# Patient Record
Sex: MALE | Race: WHITE | Employment: OTHER | URBAN - METROPOLITAN AREA
[De-identification: names, ages, dates, MRNs, and addresses within clinical notes are randomized per-mention and may not be internally consistent; named-entity substitution may affect disease eponyms.]

---

## 2018-08-03 ENCOUNTER — HOSPITAL ENCOUNTER (OUTPATIENT)
Dept: MRI IMAGING | Age: 33
Discharge: HOME | End: 2018-08-03
Payer: COMMERCIAL

## 2018-08-03 DIAGNOSIS — M25.521 ELBOW PAIN, RIGHT: ICD-10-CM

## 2018-08-03 PROCEDURE — 73221 MRI JOINT UPR EXTREM W/O DYE: CPT

## 2019-08-30 ENCOUNTER — OFFICE VISIT (OUTPATIENT)
Dept: FAMILY MEDICINE CLINIC | Age: 34
End: 2019-08-30
Payer: COMMERCIAL

## 2019-08-30 VITALS
WEIGHT: 183.5 LBS | HEART RATE: 76 BPM | SYSTOLIC BLOOD PRESSURE: 121 MMHG | HEIGHT: 69 IN | RESPIRATION RATE: 18 BRPM | OXYGEN SATURATION: 98 % | BODY MASS INDEX: 27.18 KG/M2 | DIASTOLIC BLOOD PRESSURE: 79 MMHG | TEMPERATURE: 98.3 F

## 2019-08-30 DIAGNOSIS — F98.8 ATTENTION DEFICIT DISORDER (ADD) WITHOUT HYPERACTIVITY: ICD-10-CM

## 2019-08-30 DIAGNOSIS — Z00.00 ROUTINE GENERAL MEDICAL EXAMINATION AT A HEALTH CARE FACILITY: ICD-10-CM

## 2019-08-30 DIAGNOSIS — Z23 NEEDS FLU SHOT: ICD-10-CM

## 2019-08-30 DIAGNOSIS — Z23 NEED FOR INFLUENZA VACCINATION: Primary | ICD-10-CM

## 2019-08-30 PROCEDURE — 99395 PREV VISIT EST AGE 18-39: CPT | Performed by: FAMILY MEDICINE

## 2019-08-30 PROCEDURE — 90471 IMMUNIZATION ADMIN: CPT | Performed by: FAMILY MEDICINE

## 2019-08-30 PROCEDURE — 90688 IIV4 VACCINE SPLT 0.5 ML IM: CPT | Performed by: FAMILY MEDICINE

## 2019-08-30 RX ORDER — DEXTROAMPHETAMINE SACCHARATE, AMPHETAMINE ASPARTATE MONOHYDRATE, DEXTROAMPHETAMINE SULFATE AND AMPHETAMINE SULFATE 7.5; 7.5; 7.5; 7.5 MG/1; MG/1; MG/1; MG/1
30 CAPSULE, EXTENDED RELEASE ORAL EVERY MORNING
Qty: 30 CAPSULE | Refills: 0 | Status: SHIPPED | OUTPATIENT
Start: 2019-08-30 | End: 2019-09-27 | Stop reason: SDUPTHER

## 2019-08-30 ASSESSMENT — PATIENT HEALTH QUESTIONNAIRE - PHQ9
SUM OF ALL RESPONSES TO PHQ QUESTIONS 1-9: 0
SUM OF ALL RESPONSES TO PHQ QUESTIONS 1-9: 0
1. LITTLE INTEREST OR PLEASURE IN DOING THINGS: 0
2. FEELING DOWN, DEPRESSED OR HOPELESS: 0
SUM OF ALL RESPONSES TO PHQ9 QUESTIONS 1 & 2: 0

## 2019-08-30 NOTE — PATIENT INSTRUCTIONS
1) Call or send [employer] message in 1 to 2 weeks if XR Adderall is or is not effective. 2) Review info about the Tdap (tetanus, whooping cough vaccine) which could be done in a yearly check-up. 3) Today you received your flu shot. Recommendations for Adults   Get at least 150 minutes per week of moderate-intensity aerobic activity or 75 minutes per week of vigorous aerobic activity, or a combination of both, preferably spread throughout the week.  Add moderate- to high-intensity muscle-strengthening activity (such as resistance or weights) on at least 2 days per week.  Spend less time sitting. Even light-intensity activity can offset some of the risks of being sedentary.  Gain even more benefits by being active at least 300 minutes (5 hours) per week.  Increase amount and intensity gradually over time. Recommendations for Kids   Children 15 years old should be physically active and have plenty of opportunities to move throughout the day.  Kids 6-12 years old should get at least 60 minutes per day of moderate- to vigorous-intensity physical activity, mostly aerobic.  Include vigorous-intensity activity on at least 3 days per week.  Include muscle- and bone-strengthening (weight-bearing) activities on at least 3 days per week.  Increase amount and intensity gradually over time. What is intensity? Physical activity is anything that moves your body and burns calories. This includes things like walking, climbing stairs and stretching. Aerobic (or cardio) activity gets your heart rate up and benefits your heart by improving cardiorespiratory fitness. When done at moderate intensity, your heart will beat faster and youll breathe harder than normal, but youll still be able to talk. Think of it as a medium or moderate amount of effort.   Examples of moderate-intensity aerobic activities:   brisk walking (at least 2.5 miles per hour)    water aerobics    dancing (ballroom or

## 2019-09-27 DIAGNOSIS — F98.8 ATTENTION DEFICIT DISORDER (ADD) WITHOUT HYPERACTIVITY: ICD-10-CM

## 2019-09-27 RX ORDER — DEXTROAMPHETAMINE SACCHARATE, AMPHETAMINE ASPARTATE MONOHYDRATE, DEXTROAMPHETAMINE SULFATE AND AMPHETAMINE SULFATE 7.5; 7.5; 7.5; 7.5 MG/1; MG/1; MG/1; MG/1
30 CAPSULE, EXTENDED RELEASE ORAL DAILY
Qty: 30 CAPSULE | Refills: 0 | Status: SHIPPED | OUTPATIENT
Start: 2019-09-29 | End: 2021-08-23

## 2019-09-27 RX ORDER — DEXTROAMPHETAMINE SACCHARATE, AMPHETAMINE ASPARTATE MONOHYDRATE, DEXTROAMPHETAMINE SULFATE AND AMPHETAMINE SULFATE 7.5; 7.5; 7.5; 7.5 MG/1; MG/1; MG/1; MG/1
30 CAPSULE, EXTENDED RELEASE ORAL EVERY MORNING
Qty: 30 CAPSULE | Refills: 0 | Status: SHIPPED | OUTPATIENT
Start: 2019-10-29 | End: 2021-08-23 | Stop reason: ALTCHOICE

## 2019-09-27 NOTE — TELEPHONE ENCOUNTER
Tonny Boykin called to check on the status of his refill. He was seen on 8/30/2019 and told to return in three months. His follow up appointment is scheduled for 11/4/2019. He was advised that he would get his refill for Adderall 30 mg for August, September, and October, but he only got the August script. He would like to know if his September and October script can be sent to his pharmacy. Pt has been sending messages on his refills since 9/24/2019. This message is being routed high priority. Please advise. Thanks.         Please follow up with pt [phone] (home)        [facility] #[id number], [address] [facility]

## 2020-06-25 ENCOUNTER — NURSE TRIAGE (OUTPATIENT)
Dept: OTHER | Facility: CLINIC | Age: 35
End: 2020-06-25

## 2020-06-25 ENCOUNTER — TELEPHONE (OUTPATIENT)
Dept: ADMINISTRATIVE | Age: 35
End: 2020-06-25

## 2020-06-26 ENCOUNTER — OFFICE VISIT (OUTPATIENT)
Dept: FAMILY MEDICINE CLINIC | Age: 35
End: 2020-06-26
Payer: COMMERCIAL

## 2020-06-26 VITALS
DIASTOLIC BLOOD PRESSURE: 78 MMHG | BODY MASS INDEX: 27.8 KG/M2 | HEART RATE: 73 BPM | TEMPERATURE: 96.5 F | OXYGEN SATURATION: 98 % | SYSTOLIC BLOOD PRESSURE: 115 MMHG | HEIGHT: 68 IN | WEIGHT: 183.4 LBS

## 2020-06-26 PROCEDURE — 17110 DESTRUCTION B9 LES UP TO 14: CPT | Performed by: FAMILY MEDICINE

## 2020-06-26 PROCEDURE — 99213 OFFICE O/P EST LOW 20 MIN: CPT | Performed by: FAMILY MEDICINE

## 2020-06-26 RX ORDER — HALOBETASOL PROPIONATE 0.05 %
OINTMENT (GRAM) TOPICAL
Qty: 50 G | Refills: 0 | Status: SHIPPED | OUTPATIENT
Start: 2020-06-26 | End: 2021-08-23 | Stop reason: ALTCHOICE

## 2020-06-26 ASSESSMENT — PATIENT HEALTH QUESTIONNAIRE - PHQ9
SUM OF ALL RESPONSES TO PHQ9 QUESTIONS 1 & 2: 0
SUM OF ALL RESPONSES TO PHQ QUESTIONS 1-9: 0
SUM OF ALL RESPONSES TO PHQ QUESTIONS 1-9: 0
2. FEELING DOWN, DEPRESSED OR HOPELESS: 0
1. LITTLE INTEREST OR PLEASURE IN DOING THINGS: 0

## 2021-04-09 ENCOUNTER — IMMUNIZATION (OUTPATIENT)
Dept: PRIMARY CARE CLINIC | Age: 36
End: 2021-04-09
Payer: COMMERCIAL

## 2021-04-09 PROCEDURE — 0011A COVID-19, MODERNA VACCINE 100MCG/0.5ML DOSE: CPT | Performed by: FAMILY MEDICINE

## 2021-04-09 PROCEDURE — 91301 COVID-19, MODERNA VACCINE 100MCG/0.5ML DOSE: CPT | Performed by: FAMILY MEDICINE

## 2021-05-14 ENCOUNTER — IMMUNIZATION (OUTPATIENT)
Dept: PRIMARY CARE CLINIC | Age: 36
End: 2021-05-14
Payer: COMMERCIAL

## 2021-05-14 PROCEDURE — 91301 COVID-19, MODERNA VACCINE 100MCG/0.5ML DOSE: CPT | Performed by: FAMILY MEDICINE

## 2021-05-14 PROCEDURE — 0012A PR IMM ADMN SARSCOV2 100 MCG/0.5 ML 2ND DOSE: CPT | Performed by: FAMILY MEDICINE

## 2021-08-23 ENCOUNTER — OFFICE VISIT (OUTPATIENT)
Dept: FAMILY MEDICINE CLINIC | Age: 36
End: 2021-08-23
Payer: COMMERCIAL

## 2021-08-23 VITALS
BODY MASS INDEX: 28.64 KG/M2 | WEIGHT: 185.6 LBS | SYSTOLIC BLOOD PRESSURE: 116 MMHG | OXYGEN SATURATION: 98 % | RESPIRATION RATE: 16 BRPM | HEART RATE: 89 BPM | TEMPERATURE: 96.8 F | DIASTOLIC BLOOD PRESSURE: 82 MMHG

## 2021-08-23 DIAGNOSIS — Z82.49 FHX: HEART DISEASE: ICD-10-CM

## 2021-08-23 DIAGNOSIS — Z00.00 ROUTINE GENERAL MEDICAL EXAMINATION AT A HEALTH CARE FACILITY: ICD-10-CM

## 2021-08-23 DIAGNOSIS — Z91.018 FOOD ALLERGY: Primary | ICD-10-CM

## 2021-08-23 PROCEDURE — 99213 OFFICE O/P EST LOW 20 MIN: CPT | Performed by: FAMILY MEDICINE

## 2021-08-23 RX ORDER — DEXTROAMPHETAMINE SACCHARATE, AMPHETAMINE ASPARTATE, DEXTROAMPHETAMINE SULFATE AND AMPHETAMINE SULFATE 7.5; 7.5; 7.5; 7.5 MG/1; MG/1; MG/1; MG/1
TABLET ORAL
COMMUNITY
Start: 2021-08-20

## 2021-08-23 SDOH — ECONOMIC STABILITY: TRANSPORTATION INSECURITY
IN THE PAST 12 MONTHS, HAS LACK OF TRANSPORTATION KEPT YOU FROM MEETINGS, WORK, OR FROM GETTING THINGS NEEDED FOR DAILY LIVING?: NO

## 2021-08-23 SDOH — ECONOMIC STABILITY: TRANSPORTATION INSECURITY
IN THE PAST 12 MONTHS, HAS THE LACK OF TRANSPORTATION KEPT YOU FROM MEDICAL APPOINTMENTS OR FROM GETTING MEDICATIONS?: NO

## 2021-08-23 SDOH — ECONOMIC STABILITY: FOOD INSECURITY: WITHIN THE PAST 12 MONTHS, THE FOOD YOU BOUGHT JUST DIDN'T LAST AND YOU DIDN'T HAVE MONEY TO GET MORE.: NEVER TRUE

## 2021-08-23 SDOH — ECONOMIC STABILITY: FOOD INSECURITY: WITHIN THE PAST 12 MONTHS, YOU WORRIED THAT YOUR FOOD WOULD RUN OUT BEFORE YOU GOT MONEY TO BUY MORE.: NEVER TRUE

## 2021-08-23 ASSESSMENT — SOCIAL DETERMINANTS OF HEALTH (SDOH): HOW HARD IS IT FOR YOU TO PAY FOR THE VERY BASICS LIKE FOOD, HOUSING, MEDICAL CARE, AND HEATING?: NOT HARD AT ALL

## 2021-08-23 NOTE — PATIENT INSTRUCTIONS
CONTINUE TO FIND A WAY TO GET THE EXERCISE TO ACHIEVE YOUR WEIGHT GOALS. IF NEEDING TO CONSULT WITH NUTRITIONIST, REACH OUT TO [employer]. PLAN TO COMPLETE FASTING LABWORK IDEALLY IN 2021. CALL [employer] TO SCHEDULE CALCIUM SCORING TEST. NO APPOINTMENT NECESSARY  WE ACCEPT ALL MAJOR INSURANCE PLANS  WE ACCEPT SELF PAYING PATIENTS  CALL [phone] FOR MORE INFORMATION    [facility]  [address], [address] [address], [address]  Phone: [phone] Fax: [phone]  Mon.Fri. 7 a. m.5 p.m. Sat. 8 a.m.Noon    [address] [facility] lazaro [address]  Phone: [phone] and [phone]  Fax: [phone]  Mon.Fri. 7:30 a. m.4 p.m. Glynn Kiran [address] [place] [address], [facility]  Phone: [phone]  Mon.Fri. 7:30 a. m.4 p.m. [facility] Services [address] [place] [address]. [address]  [place], [address] [place] [address] [address]  Phone [phone]  Fax: [phone]  Mon-Fri 8 a.m.-5:30 p.m. [address] [facility]  [address] [facility], [address] [facility]  Phone: [phone]  Fax [phone]  Mon-Fri 7:30 a.m - 4 p.m. [facility]  [address] [facility], [address] [place] [address], [address]  Phone: [phone]  [facility]., Tue., Thu., Fri. 7:30 a. m.5 p.m.  Wed. 7:30 a.m.[facility]  [address]  [place], [address] [facility]  Phone: [phone] Fax: [phone]  Mon.Fri. 7:30 a. m.4 p.m. [address] [facility] and Lab Services  [address], [address] [facility], [facility]  Phone: [phone] Fax: [phone]  Mon.Fri. 8 a. m.4:30 p.m. [address]  Phone: [phone]  Mon.Fri. 7:30 a. m.4 p.m.     Stephanie Barrett - Lab Services  [address]  [facility]  Phone: [phone] Fax: [phone]  Mon.Fri. 7 a.m.5 p.m. Sat. 8 a.m.Noon [facility] [place], [place] [address]  [address]  Phone: [phone] Fax: [phone]  Mon.Fri. 7 a. m.5 p.m. [facility]  [address] [place], [address] [facility]  Phone: [phone] Fax: [phone]  Mon.Fri. 7 a. m.5 p.m. [address] [facility]  [address] [facility], [address] [place] [address]  Phone: [phone]  Mon.Fri. 6:30 a. m.6 p.m. Sat. 7 a. m. 1 p.m. [place] [address], [place], [facility]  Phone: [phone]  Open 24/7    [address], [address] [place] [address]  Phone: [phone]  Mon.Fri. 6 a. m.7 p.m. Sat. 7 a. m.3 p.m. [facility]  [address] [facility]  Phone: [phone]  Mon.Fri. 7 a. m.5 p.m. Sat. Sun. 8a.12 p.m    [facility] and [address]  Phone: [phone]  Mon.Fri. 9 a. m.  1 p.m. [address] [place] [address]. [address]  Phone: [phone]  Open 24/7    [facility]  [address], [address] [place] [address]  Phone: [phone]  Mon.Fri. 6:30 a. m.6:30 p.m. Sat. 8 a.m.[facility]  Phone: [phone] Fax: [phone]  Mon.Fri. 8:30 a. m.5 p.m. [facility]   [address]  Phone: [phone] Fax: [phone]  Mon.Fri. 8 a. m.4:30 p.m. [address]  Phone: [phone]  Fax: [phone]  Mon-Fri 7:30 am 4 p.m. Please call ahead to check hours.

## 2021-08-23 NOTE — PROGRESS NOTES
[facility]  Progress Note  Dona Kayser, DO          Glenn Grove  1985 08/23/21    Chief Complaint:   Glenn Grove is a 31 y.o. 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 Would like to get his weight under 180. His father-in-law's had recent heart problems identified. Patient is interested to get some baseline blood work and other testing. Experiences abdominal bloating and distention at times with certain foods. He did a at home test to check for certain food allergies. He does not have those reports with him. Not known to have any peanut allergies. Does see a psychiatrist in the [place] area. ROS negative for headache, visionchanges, chest pain, shortness of breath, abdominal pain, urinary sx, bowel changes. Past medical, surgical, and social history reviewed. and allergies reviewed. Allergies   Allergen Reactions    Benadryl [Diphenhydramine]      Prior to Visit Medications    Medication Sig Taking? Authorizing Provider   amphetamine-dextroamphetamine (ADDERALL) 30 MG tablet TAKE 1 AND 1/2 TABLETS BY MOUTH TWICE DAILY Yes Historical Provider, MD          Vitals:    08/23/21 1420 08/23/21 1448   BP: 130/88 116/82   Pulse: 89    Resp: 16    Temp: 96.8 °F (36 °C)    TempSrc: Tympanic    SpO2: 98%    Weight: 185 lb 9.6 oz (84.2 kg)       Wt Readings from Last 3 Encounters:   08/23/21 185 lb 9.6 oz (84.2 kg)   06/26/20 183 lb 6.4 oz (83.2 kg)   08/30/19 183 lb 8 oz (83.2 kg)     BP Readings from Last 3 Encounters:   08/23/21 116/82   06/26/20 115/78   08/30/19 121/79       There is no problem list on file for this patient.       Immunization History   Administered Date(s) Administered    COVID-19, Moderna, PF, 100mcg/0.5mL 04/09/2021, 05/14/2021    Influenza, Quadv, IM, (6 mo and older Fluzone, Flulaval, Fluarix and 3 yrs and older Afluria) 08/30/2019       Past Medical History:   Diagnosis Date    ADHD      Past Surgical History:   Procedure Laterality Date    ADENOIDECTOMY      childhood    TYMPANOSTOMY TUBE PLACEMENT      child     Family History   Problem Relation Age of Onset    Heart Attack Maternal Grandfather     Cancer Paternal Grandfather         throat     Social History     Socioeconomic History    Marital status:      Spouse name: Not on file    Number of children: Not on file    Years of education: Not on file    Highest education level: Not on file   Occupational History    Not on file   Tobacco Use    Smoking status: Never Smoker    Smokeless tobacco: Never Used   Substance and Sexual Activity    Alcohol use: Yes     Comment: soc     Drug use: No    Sexual activity: Not on file   Other Topics Concern    Not on file   Social History Narrative    Not on file     Social Determinants of Health     Financial Resource Strain: Low Risk     Difficulty of Paying Living Expenses: Not hard at all   Food Insecurity: No Food Insecurity    Worried About [address] [employer] in the Last Year: Never true    [address] in the Last Year: Never true   Transportation Needs: No Transportation Needs    Lack of Transportation (Medical): No    Lack of Transportation (Non-Medical):  No   Physical Activity:     Days of Exercise per Week:     Minutes of Exercise per Session:    Stress:     Feeling of Stress :    Social Connections:     Frequency of Communication with Friends and Family:     Frequency of Social Gatherings with Friends and Family:     Attends Sikhism Services:     Active Member of Clubs or Organizations:     Attends Club or Organization Meetings:     Marital Status:    Intimate Partner Violence:     Fear of Current or Ex-Partner:     Emotionally Abused:     Physically Abused:     Sexually Abused:        O: /82   Pulse 89   Temp 96.8 °F (36 °C) (Tympanic)   Resp 16   Wt 185 lb 9.6 oz (84.2 kg) SpO2 98%   BMI 28.64 kg/m²   Physical Exam  GEN: No acute distress,cooperative, well nourished, alert. HEENT: PEERLA, EOMI , normocephalic/atraumatic, external nose appears normal.  External ear is normal.    Neck: soft, supple, no appreciable thyromegaly,mass  CV: No upper extremity edema. Resp:  Breathing comfortably. Psych:normal affect. Neuro: AOx3  Other Pertinent Physical Exam findings:   Heart: Normal S1 and S2 with regular rhythm. Lungs: Clear to auscultation bilaterally. Abd: No tenderness to palpation. ASSESSMENT   Diagnosis Orders   1. Food allergy  Allergen, Food, Comprehensive Profile 1   2. FHx: heart disease  CT CARDIAC CALCIUM SCORING   3. Routine general medical examination at a [facility] facility  LIPID PANEL    COMPREHENSIVE METABOLIC PANEL       Check for specific food allergies with the blood work. He will call to schedule the CT calcium scoring test. Discussed importance of getting baseline lab work. PLAN          Time spent on encounter (including any number of the following: review of labs, imaging, provider notes, outside hospital records; performing examination/evaluation; counseling patient and family; ordering medications/tests; placing referrals and communication with referring physicians; coordination of care, and documentation in the EHR): 26 minutes  Established E/M: 10-19 (59152), 20-29 (85113), 30-39 (22518), 40-54 (87566)   New E/M: 15-29 (72519), 30-44 (99863), 45-59 (02669), 60-74 (82111)  Telephone E/M: 5-10 (Beto), 11-20 (21194), 21-30 (15766)    If applicable, see additional patient information and instructions under \"Patient Instructions. \"    Return if symptoms worsen or fail to improve. Patient Instructions   CONTINUE TO FIND A WAY TO GET THE EXERCISE TO ACHIEVE YOUR WEIGHT GOALS. IF NEEDING TO CONSULT WITH NUTRITIONIST, REACH OUT TO [employer]. PLAN TO COMPLETE FASTING LABWORK IDEALLY IN 2021.     CALL [employer] TO SCHEDULE CALCIUM SCORING TEST.      NO APPOINTMENT NECESSARY  WE ACCEPT ALL MAJOR INSURANCE PLANS  WE ACCEPT SELF PAYING PATIENTS  CALL [phone] FOR MORE INFORMATION    [facility]  [address] [employer], [address] [facility], [address] [place] [address]  Phone: [phone] Fax: [phone]  Mon.Fri. 7 a. m.5 p.m. Sat. 8 a.m.Noon    [address] [facility]  [facility] [place]  Phone: [phone] and [phone]  Fax: [phone]  Mon.Fri. 7:30 a. m.4 p.m. Glynn Kiran [address]  [facility] 70  Phone: [phone]  Mon.Fri. 7:30 a. m.4 p.m. [facility] [address] [place] [address] [address]  [place], [address] [place] [address] [address]  Phone [phone]  Fax: [phone]  Mon-Fri 8 a.m.-5:30 p.m. [facility] [facility]  [address] [facility], [address] [facility]  Phone: [phone]  Fax [phone]  Mon-Fri 7:30 a.m - 4 p.m. [facility]  [address], [address], [address]  Phone: [phone]  Kofi Kellogg., Tue., Thu., Fri. 7:30 a. m.5 p.m.  Wed. 7:30 a.m.[facility]  [address] [facility] [address] [address]  [place], [address] [facility]  Phone: [phone] Fax: [phone]  Mon.Fri. 7:30 a. m.4 p.m. [address] [facility] and Lab Services  [facility] [address], [address] [facility], [address] [facility]  Phone: [phone] Fax: [phone]  Mon.Fri. 8 a. m.4:30 p.m. [address] [facility] [place], [facility] [facility] [address]  Phone: [phone]  Mon.Fri. 7:30 a. m.4 p.m. [address] [facility]  [address] [facility]  Phone: [phone] Fax: [phone]  Mon.Fri. 7 a. m.5 p.m. Sat. 8 a.m.Noon [facility] [place], [facility] [address]  [place], [address] [place] [address]  Phone: [phone] Fax: [phone]  Mon.Fri. 7 a. m.5 p.m.     [employer]  [facility] Bob Ashbyalyssa Manning [place], [place]

## 2024-01-30 ENCOUNTER — OFFICE VISIT (OUTPATIENT)
Dept: FAMILY MEDICINE CLINIC | Age: 39
End: 2024-01-30
Payer: COMMERCIAL

## 2024-01-30 VITALS
WEIGHT: 189 LBS | DIASTOLIC BLOOD PRESSURE: 84 MMHG | HEART RATE: 76 BPM | TEMPERATURE: 98.5 F | BODY MASS INDEX: 28.64 KG/M2 | HEIGHT: 68 IN | SYSTOLIC BLOOD PRESSURE: 118 MMHG | OXYGEN SATURATION: 97 %

## 2024-01-30 DIAGNOSIS — F90.2 ATTENTION DEFICIT HYPERACTIVITY DISORDER (ADHD), COMBINED TYPE: ICD-10-CM

## 2024-01-30 DIAGNOSIS — J01.40 ACUTE NON-RECURRENT PANSINUSITIS: ICD-10-CM

## 2024-01-30 DIAGNOSIS — Z00.00 ROUTINE GENERAL MEDICAL EXAMINATION AT A HEALTH CARE FACILITY: Primary | ICD-10-CM

## 2024-01-30 PROBLEM — J01.90 ACUTE SINUSITIS: Status: ACTIVE | Noted: 2024-01-30

## 2024-01-30 PROBLEM — F98.8 ATTENTION DEFICIT DISORDER: Status: ACTIVE | Noted: 2024-01-30

## 2024-01-30 PROCEDURE — 99395 PREV VISIT EST AGE 18-39: CPT | Performed by: NURSE PRACTITIONER

## 2024-01-30 RX ORDER — DEXTROAMPHETAMINE SACCHARATE, AMPHETAMINE ASPARTATE MONOHYDRATE, DEXTROAMPHETAMINE SULFATE AND AMPHETAMINE SULFATE 7.5; 7.5; 7.5; 7.5 MG/1; MG/1; MG/1; MG/1
1 CAPSULE, EXTENDED RELEASE ORAL 2 TIMES DAILY
COMMUNITY
Start: 2024-01-09

## 2024-01-30 SDOH — ECONOMIC STABILITY: FOOD INSECURITY: WITHIN THE PAST 12 MONTHS, THE FOOD YOU BOUGHT JUST DIDN'T LAST AND YOU DIDN'T HAVE MONEY TO GET MORE.: NEVER TRUE

## 2024-01-30 SDOH — ECONOMIC STABILITY: INCOME INSECURITY: HOW HARD IS IT FOR YOU TO PAY FOR THE VERY BASICS LIKE FOOD, HOUSING, MEDICAL CARE, AND HEATING?: NOT HARD AT ALL

## 2024-01-30 SDOH — ECONOMIC STABILITY: FOOD INSECURITY: WITHIN THE PAST 12 MONTHS, YOU WORRIED THAT YOUR FOOD WOULD RUN OUT BEFORE YOU GOT MONEY TO BUY MORE.: NEVER TRUE

## 2024-01-30 SDOH — ECONOMIC STABILITY: HOUSING INSECURITY
IN THE LAST 12 MONTHS, WAS THERE A TIME WHEN YOU DID NOT HAVE A STEADY PLACE TO SLEEP OR SLEPT IN A SHELTER (INCLUDING NOW)?: NO

## 2024-01-30 ASSESSMENT — ENCOUNTER SYMPTOMS
CONSTIPATION: 0
SHORTNESS OF BREATH: 0
COLOR CHANGE: 0
SORE THROAT: 1
WHEEZING: 0
SINUS PAIN: 0
BACK PAIN: 0
RHINORRHEA: 1
DIARRHEA: 0
ABDOMINAL PAIN: 0
COUGH: 1
SINUS PRESSURE: 1

## 2024-01-30 ASSESSMENT — PATIENT HEALTH QUESTIONNAIRE - PHQ9
SUM OF ALL RESPONSES TO PHQ QUESTIONS 1-9: 0
1. LITTLE INTEREST OR PLEASURE IN DOING THINGS: 0
DEPRESSION UNABLE TO ASSESS: FUNCTIONAL CAPACITY MOTIVATION LIMITS ACCURACY
SUM OF ALL RESPONSES TO PHQ9 QUESTIONS 1 & 2: 0
SUM OF ALL RESPONSES TO PHQ QUESTIONS 1-9: 0

## 2024-01-30 NOTE — ASSESSMENT & PLAN NOTE
Well exam in office   Discussed healthy diet and active lifestyle   Discussed vaccines   Reviewed and updated     Check labs   Declines vaccines today

## 2024-01-30 NOTE — ASSESSMENT & PLAN NOTE
Slowly improving   Continue symptom management   No indication for antibiotic   Call if no better in 1-2 weeks

## 2024-02-29 PROBLEM — Z00.00 ROUTINE GENERAL MEDICAL EXAMINATION AT A HEALTH CARE FACILITY: Status: RESOLVED | Noted: 2024-01-30 | Resolved: 2024-02-29

## 2024-10-18 ENCOUNTER — OFFICE VISIT (OUTPATIENT)
Dept: FAMILY MEDICINE CLINIC | Age: 39
End: 2024-10-18
Payer: COMMERCIAL

## 2024-10-18 VITALS
DIASTOLIC BLOOD PRESSURE: 78 MMHG | BODY MASS INDEX: 28.64 KG/M2 | HEIGHT: 68 IN | TEMPERATURE: 98.5 F | SYSTOLIC BLOOD PRESSURE: 110 MMHG | HEART RATE: 78 BPM | WEIGHT: 189 LBS | OXYGEN SATURATION: 97 %

## 2024-10-18 DIAGNOSIS — R05.2 SUBACUTE COUGH: Primary | ICD-10-CM

## 2024-10-18 PROCEDURE — 99213 OFFICE O/P EST LOW 20 MIN: CPT | Performed by: NURSE PRACTITIONER

## 2024-10-18 RX ORDER — BENZONATATE 200 MG/1
200 CAPSULE ORAL 3 TIMES DAILY PRN
Qty: 30 CAPSULE | Refills: 0 | Status: SHIPPED | OUTPATIENT
Start: 2024-10-18 | End: 2024-10-25

## 2024-10-18 RX ORDER — METHYLPREDNISOLONE 4 MG
TABLET, DOSE PACK ORAL
Qty: 1 KIT | Refills: 0 | Status: SHIPPED | OUTPATIENT
Start: 2024-10-18 | End: 2024-10-24

## 2024-10-18 ASSESSMENT — ENCOUNTER SYMPTOMS
BACK PAIN: 0
DIARRHEA: 0
WHEEZING: 0
ABDOMINAL PAIN: 0
CONSTIPATION: 0
COLOR CHANGE: 0
SINUS PRESSURE: 0
SHORTNESS OF BREATH: 0
SINUS PAIN: 0
COUGH: 1

## 2024-10-18 NOTE — PROGRESS NOTES
Joseph Carey (:  1985) is a 39 y.o. male,Established patient, here for evaluation of the following chief complaint(s):  Cough (Productive cough which has been present for ~3 weeks, neck/shoulder pain due to the cough)     Assessment & Plan  Subacute cough   Acute condition, new, will start Medrol Dosepak.  Start Tessalon as needed.  Call if no better.           No follow-ups on file.         Subjective   HPI  Patient is here for concern of cough that has been ongoing for the past 3 weeks.  He reports congestion, sinus pressure, and fatigue 3 weeks ago which has gotten better but still having persistent cough.  Denies any shortness of breath or wheezing.  No fever, body aches, chills.  He has been taking Mucinex with pseudoephedrine which is somewhat helpful.  He also ports some upper back and neck pain from coughing.  He has been taking ibuprofen and using a heating pad for this which is helping some.    No Known Allergies    Current Outpatient Medications   Medication Sig Dispense Refill    methylPREDNISolone (MEDROL DOSEPACK) 4 MG tablet Take by mouth. 1 kit 0    benzonatate (TESSALON) 200 MG capsule Take 1 capsule by mouth 3 times daily as needed for Cough 30 capsule 0    amphetamine-dextroamphetamine (ADDERALL XR) 30 MG extended release capsule Take 1 capsule by mouth 2 times daily.      amphetamine-dextroamphetamine (ADDERALL) 30 MG tablet Take 2 tablets by mouth 2 times daily.       No current facility-administered medications for this visit.       Review of Systems   Constitutional:  Negative for chills, fatigue and fever.   HENT:  Negative for congestion, sinus pressure and sinus pain.    Respiratory:  Positive for cough. Negative for shortness of breath and wheezing.    Cardiovascular:  Negative for chest pain and palpitations.   Gastrointestinal:  Negative for abdominal pain, constipation and diarrhea.   Musculoskeletal:  Positive for myalgias. Negative for arthralgias and back pain.   Skin: